# Patient Record
Sex: FEMALE | Race: BLACK OR AFRICAN AMERICAN | URBAN - METROPOLITAN AREA
[De-identification: names, ages, dates, MRNs, and addresses within clinical notes are randomized per-mention and may not be internally consistent; named-entity substitution may affect disease eponyms.]

---

## 2019-04-28 ENCOUNTER — ED HISTORICAL/CONVERTED ENCOUNTER (OUTPATIENT)
Dept: OTHER | Age: 24
End: 2019-04-28

## 2020-08-04 ENCOUNTER — OFFICE VISIT (OUTPATIENT)
Dept: ENT CLINIC | Age: 25
End: 2020-08-04
Payer: COMMERCIAL

## 2020-08-04 VITALS
BODY MASS INDEX: 32.38 KG/M2 | OXYGEN SATURATION: 98 % | DIASTOLIC BLOOD PRESSURE: 80 MMHG | HEIGHT: 69 IN | SYSTOLIC BLOOD PRESSURE: 110 MMHG | HEART RATE: 82 BPM | WEIGHT: 218.6 LBS

## 2020-08-04 DIAGNOSIS — H93.13 TINNITUS OF BOTH EARS: ICD-10-CM

## 2020-08-04 DIAGNOSIS — H93.13 TINNITUS OF BOTH EARS: Primary | ICD-10-CM

## 2020-08-04 PROCEDURE — 92557 COMPREHENSIVE HEARING TEST: CPT | Performed by: AUDIOLOGIST

## 2020-08-04 PROCEDURE — 92550 TYMPANOMETRY & REFLEX THRESH: CPT | Performed by: AUDIOLOGIST

## 2020-08-04 PROCEDURE — 99212 OFFICE O/P EST SF 10 MIN: CPT | Performed by: OTOLARYNGOLOGY

## 2020-08-04 NOTE — PROGRESS NOTES
Yehuda Dudley, a 22y.o. year old female was seen in ENT clinic today for a hearing evaluation on referral from Dr. June Martinez. Patient complains of bilateral tinnitus, which she feels is worse in the right ear, and concerns of decreased hearing in both ears. 226 Hz tympanometry: RE Type A, normal  LE Type A, normal  Acoustic reflexes 500-2000Hz:  Right ipsi: present only at 1000Hz  Right contra (phone left): absent at all tested frequencies       Left ipsi: present at all tested frequencies Left contra (phone right): present at all tested frequencies  *4000Hz not tested due to patient sensitivity and movement. SRT: 15 dBHL, bilaterally    Word recognition in the right ear was Excellentin quiet when words were presented at 55 dBHL. Word recognition in the left ear was Excellentin quiet when words were presented at 55 dBHL. Pure tone audiometry:  RE Borderline WNL  LE WNL    normal hearing thresholds bilaterally    Impressions:  normal hearing sensitivity    Plan:  Follow-up with ENT.   Repeat audiogram upon request.  Tinnitus masker evaluation upon request    CHRISTINE Webber   Doctor of Audiology

## 2024-03-13 ENCOUNTER — NEW PATIENT (OUTPATIENT)
Dept: URBAN - METROPOLITAN AREA CLINIC 49 | Facility: CLINIC | Age: 29
End: 2024-03-13

## 2024-03-13 DIAGNOSIS — G43.B0: ICD-10-CM

## 2024-03-13 PROCEDURE — 92134 CPTRZ OPH DX IMG PST SGM RTA: CPT | Mod: NC

## 2024-03-13 PROCEDURE — 92202 OPSCPY EXTND ON/MAC DRAW: CPT | Mod: NC

## 2024-03-13 PROCEDURE — 99204 OFFICE O/P NEW MOD 45 MIN: CPT

## 2024-03-13 ASSESSMENT — TONOMETRY
OS_IOP_MMHG: 22
OD_IOP_MMHG: 23

## 2024-03-13 ASSESSMENT — VISUAL ACUITY
OS_CC: 20/20
OD_CC: 20/20